# Patient Record
Sex: FEMALE | Race: WHITE | HISPANIC OR LATINO
[De-identification: names, ages, dates, MRNs, and addresses within clinical notes are randomized per-mention and may not be internally consistent; named-entity substitution may affect disease eponyms.]

---

## 2020-02-08 ENCOUNTER — TRANSCRIPTION ENCOUNTER (OUTPATIENT)
Age: 30
End: 2020-02-08

## 2020-03-21 ENCOUNTER — TRANSCRIPTION ENCOUNTER (OUTPATIENT)
Age: 30
End: 2020-03-21

## 2020-06-28 ENCOUNTER — FORM ENCOUNTER (OUTPATIENT)
Age: 30
End: 2020-06-28

## 2020-12-20 ENCOUNTER — FORM ENCOUNTER (OUTPATIENT)
Age: 30
End: 2020-12-20

## 2021-01-28 ENCOUNTER — OUTPATIENT (OUTPATIENT)
Dept: OUTPATIENT SERVICES | Facility: HOSPITAL | Age: 31
LOS: 1 days | Discharge: HOME | End: 2021-01-28
Payer: COMMERCIAL

## 2021-01-28 DIAGNOSIS — E05.90 THYROTOXICOSIS, UNSPECIFIED WITHOUT THYROTOXIC CRISIS OR STORM: ICD-10-CM

## 2021-01-28 PROCEDURE — 76536 US EXAM OF HEAD AND NECK: CPT | Mod: 26

## 2021-06-02 ENCOUNTER — RX RENEWAL (OUTPATIENT)
Age: 31
End: 2021-06-02

## 2021-06-02 PROBLEM — Z00.00 ENCOUNTER FOR PREVENTIVE HEALTH EXAMINATION: Status: ACTIVE | Noted: 2021-06-02

## 2021-06-21 ENCOUNTER — APPOINTMENT (OUTPATIENT)
Dept: OBGYN | Facility: CLINIC | Age: 31
End: 2021-06-21
Payer: COMMERCIAL

## 2021-06-21 VITALS
HEART RATE: 99 BPM | TEMPERATURE: 98.8 F | SYSTOLIC BLOOD PRESSURE: 126 MMHG | DIASTOLIC BLOOD PRESSURE: 91 MMHG | WEIGHT: 194 LBS | BODY MASS INDEX: 34.38 KG/M2 | HEIGHT: 63 IN

## 2021-06-21 DIAGNOSIS — Z82.49 FAMILY HISTORY OF ISCHEMIC HEART DISEASE AND OTHER DISEASES OF THE CIRCULATORY SYSTEM: ICD-10-CM

## 2021-06-21 DIAGNOSIS — Z78.9 OTHER SPECIFIED HEALTH STATUS: ICD-10-CM

## 2021-06-21 DIAGNOSIS — Z83.42 FAMILY HISTORY OF FAMILIAL HYPERCHOLESTEROLEMIA: ICD-10-CM

## 2021-06-21 PROCEDURE — 99213 OFFICE O/P EST LOW 20 MIN: CPT | Mod: 25

## 2021-06-21 PROCEDURE — 99072 ADDL SUPL MATRL&STAF TM PHE: CPT

## 2021-06-21 PROCEDURE — 76830 TRANSVAGINAL US NON-OB: CPT

## 2021-06-21 NOTE — PLAN
[FreeTextEntry1] : Transvaginal ultrasound done in office today remarkable for right ovarian simple cyst, otherwise unremarkable.  We will continue oral contraceptive pills for menstrual regulation, ovarian cyst suppression, and pregnancy prevention.  If she continues to form ovarian cysts despite being on medication will consider increasing dose at next visit.

## 2021-06-21 NOTE — PHYSICAL EXAM
[Appropriately responsive] : appropriately responsive [Alert] : alert [No Acute Distress] : no acute distress [Oriented x3] : oriented x3 [Labia Majora] : normal [Labia Minora] : normal [Normal] : normal [Uterine Adnexae] : normal [FreeTextEntry5] : Nonlabored

## 2021-06-21 NOTE — HISTORY OF PRESENT ILLNESS
[FreeTextEntry1] : 31-year-old female here for follow-up GYN visit.  She has a history of menorrhagia and a right ovarian cyst.  She is on oral contraceptive pills for menstrual regulation, ovarian cyst suppression, and pregnancy prevention.  She is taking the medication as prescribed, denies unwanted side effects, cycles much improved.

## 2021-06-21 NOTE — PROCEDURE
[Abnormal Uterine Bleeding] : abnormal uterine bleeding [Suspected Ovarian Cyst] : suspected ovarian cyst [Transvaginal Ultrasound] : transvaginal ultrasound [L: ___ cm] : L: [unfilled] cm [W: ___cm] : W: [unfilled] cm [H: ___ cm] : H: [unfilled] cm [FreeTextEntry9] : Menorrhagia [FreeTextEntry5] : 63 cc, 4.1 mm EMS [FreeTextEntry7] : 3.5 x 3.7 x 2.7 cm, 2.5 cm right ovarian simple cyst [FreeTextEntry8] : 2.3 x 2.6 x 2.8 cm, normal-appearing

## 2021-06-24 ENCOUNTER — TRANSCRIPTION ENCOUNTER (OUTPATIENT)
Age: 31
End: 2021-06-24

## 2021-06-29 ENCOUNTER — RX RENEWAL (OUTPATIENT)
Age: 31
End: 2021-06-29

## 2021-09-13 ENCOUNTER — APPOINTMENT (OUTPATIENT)
Dept: OBGYN | Facility: CLINIC | Age: 31
End: 2021-09-13
Payer: COMMERCIAL

## 2021-09-13 ENCOUNTER — NON-APPOINTMENT (OUTPATIENT)
Age: 31
End: 2021-09-13

## 2021-09-13 VITALS
HEART RATE: 121 BPM | WEIGHT: 193 LBS | SYSTOLIC BLOOD PRESSURE: 126 MMHG | TEMPERATURE: 98.9 F | HEIGHT: 63 IN | BODY MASS INDEX: 34.2 KG/M2 | DIASTOLIC BLOOD PRESSURE: 94 MMHG

## 2021-09-13 DIAGNOSIS — N76.0 ACUTE VAGINITIS: ICD-10-CM

## 2021-09-13 LAB
BILIRUB UR QL STRIP: NORMAL
CLARITY UR: CLEAR
COLLECTION METHOD: NORMAL
GLUCOSE UR-MCNC: NORMAL
HCG UR QL: 0.2 EU/DL
HGB UR QL STRIP.AUTO: NORMAL
KETONES UR-MCNC: NORMAL
LEUKOCYTE ESTERASE UR QL STRIP: NORMAL
NITRITE UR QL STRIP: NORMAL
PH UR STRIP: 7
PROT UR STRIP-MCNC: NORMAL
SP GR UR STRIP: 1.01

## 2021-09-13 PROCEDURE — 81003 URINALYSIS AUTO W/O SCOPE: CPT | Mod: QW

## 2021-09-13 PROCEDURE — 99213 OFFICE O/P EST LOW 20 MIN: CPT

## 2021-09-13 NOTE — PLAN
[FreeTextEntry1] : Urine dip in office negative, will follow up urinary and vaginal cultures and treat accordingly.\par Perineal hygiene practices reviewed, advised to avoid products with containing additives/scents or antibacterial components, and the use of cotton under garments and loose fitting clothing.\par

## 2021-09-13 NOTE — HISTORY OF PRESENT ILLNESS
[FreeTextEntry1] : 31-year-old G0 here for follow-up GYN visit.  She started experiencing recent vulvovaginal irritation with intense itching at the introitus and externally.  She used over-the-counter treatments which initially improved her symptoms however they recurred.  Symptoms did improve using cold compresses.  She denies any vaginal odor, discharge, or pain with urination.

## 2021-09-14 LAB
APPEARANCE: CLEAR
BILIRUBIN URINE: NEGATIVE
BLOOD URINE: NEGATIVE
COLOR: NORMAL
GLUCOSE QUALITATIVE U: NEGATIVE
KETONES URINE: NEGATIVE
LEUKOCYTE ESTERASE URINE: NEGATIVE
NITRITE URINE: NEGATIVE
PH URINE: 6.5
PROTEIN URINE: NEGATIVE
SPECIFIC GRAVITY URINE: 1.01
UROBILINOGEN URINE: NORMAL

## 2021-09-15 LAB — BACTERIA UR CULT: NORMAL

## 2021-10-22 ENCOUNTER — RX RENEWAL (OUTPATIENT)
Age: 31
End: 2021-10-22

## 2021-12-15 ENCOUNTER — RX RENEWAL (OUTPATIENT)
Age: 31
End: 2021-12-15

## 2021-12-17 ENCOUNTER — RX RENEWAL (OUTPATIENT)
Age: 31
End: 2021-12-17

## 2021-12-17 ENCOUNTER — NON-APPOINTMENT (OUTPATIENT)
Age: 31
End: 2021-12-17

## 2022-01-07 ENCOUNTER — RX RENEWAL (OUTPATIENT)
Age: 32
End: 2022-01-07

## 2022-01-10 ENCOUNTER — APPOINTMENT (OUTPATIENT)
Dept: OBGYN | Facility: CLINIC | Age: 32
End: 2022-01-10
Payer: COMMERCIAL

## 2022-01-10 VITALS
SYSTOLIC BLOOD PRESSURE: 136 MMHG | TEMPERATURE: 97.3 F | HEART RATE: 111 BPM | DIASTOLIC BLOOD PRESSURE: 90 MMHG | BODY MASS INDEX: 34.2 KG/M2 | HEIGHT: 63 IN | WEIGHT: 193 LBS

## 2022-01-10 DIAGNOSIS — E03.9 HYPOTHYROIDISM, UNSPECIFIED: ICD-10-CM

## 2022-01-10 DIAGNOSIS — Z11.3 ENCOUNTER FOR SCREENING FOR INFECTIONS WITH A PREDOMINANTLY SEXUAL MODE OF TRANSMISSION: ICD-10-CM

## 2022-01-10 LAB
BILIRUB UR QL STRIP: NEGATIVE
CLARITY UR: CLEAR
COLLECTION METHOD: NORMAL
GLUCOSE UR-MCNC: NEGATIVE
HCG UR QL: 0.2 EU/DL
HGB UR QL STRIP.AUTO: NORMAL
KETONES UR-MCNC: NEGATIVE
LEUKOCYTE ESTERASE UR QL STRIP: NEGATIVE
NITRITE UR QL STRIP: NEGATIVE
PH UR STRIP: 6
PROT UR STRIP-MCNC: NEGATIVE
SP GR UR STRIP: <=1.005

## 2022-01-10 PROCEDURE — 76830 TRANSVAGINAL US NON-OB: CPT

## 2022-01-10 PROCEDURE — 81003 URINALYSIS AUTO W/O SCOPE: CPT | Mod: QW

## 2022-01-10 PROCEDURE — 99395 PREV VISIT EST AGE 18-39: CPT | Mod: 25

## 2022-01-10 NOTE — PLAN
[FreeTextEntry1] : Pap smear with reflex HPV testing, GC CT and trichomonas cultures done today.\par Transvaginal ultrasound done in office to better visualize pelvic anatomic structures notable for right ovarian simple cyst, otherwise unremarkable, will continue to monitor.  We will continue oral contraceptive pills for menstrual regulation, cyst suppression, and pregnancy prevention.  If she continues to form cysts on current medication regime will consider altering dosage at next visit.

## 2022-01-10 NOTE — PROCEDURE
[Cervical Pap Smear] : cervical Pap smear [Liquid Base] : liquid base [Tolerated Well] : the patient tolerated the procedure well [No Complications] : there were no complications [Suspected Ovarian Cyst] : suspected ovarian cyst [Transvaginal Ultrasound] : transvaginal ultrasound [L: ___ cm] : L: [unfilled] cm [W: ___cm] : W: [unfilled] cm [H: ___ cm] : H: [unfilled] cm [FreeTextEntry5] : 38 cc, 3.3 mm EMS<normal-appearing cervix, normal-appearing cul-de-sac [FreeTextEntry7] : 4.7 x 4.5 x 3.5 cm, 3 cm simple cyst [FreeTextEntry8] : 3 x 2.4 x 2.4 cm, normal-appearing

## 2022-01-10 NOTE — PHYSICAL EXAM
[Chaperone Present] : A chaperone was present in the examining room during all aspects of the physical examination [Appropriately responsive] : appropriately responsive [Alert] : alert [No Acute Distress] : no acute distress [Oriented x3] : oriented x3 [Labia Majora] : normal [Labia Minora] : normal [Uterine Adnexae] : normal [Examination Of The Breasts] : a normal appearance [Normal] : normal [No Masses] : no breast masses were palpable [FreeTextEntry5] : Nonlabored

## 2022-01-10 NOTE — HISTORY OF PRESENT ILLNESS
[FreeTextEntry1] : 31-year-old G0 here for annual GYN visit.  She is on oral contraceptive pills for menstrual regulation, cyst suppression and pregnancy prevention.  She is taking medication as prescribed, denies unwanted side effects, menstrual cycles much improved on current medication regime, denies current cystlike pain.  She sexual active with 1 partner, inconsistent condom use.

## 2022-01-14 LAB
C TRACH RRNA SPEC QL NAA+PROBE: NOT DETECTED
N GONORRHOEA RRNA SPEC QL NAA+PROBE: NOT DETECTED
SOURCE AMPLIFICATION: NORMAL
SOURCE AMPLIFICATION: NORMAL
T VAGINALIS RRNA SPEC QL NAA+PROBE: NOT DETECTED

## 2022-01-18 LAB — CYTOLOGY CVX/VAG DOC THIN PREP: NORMAL

## 2022-06-27 ENCOUNTER — APPOINTMENT (OUTPATIENT)
Dept: OBGYN | Facility: CLINIC | Age: 32
End: 2022-06-27
Payer: COMMERCIAL

## 2022-06-27 VITALS
HEIGHT: 63 IN | TEMPERATURE: 98.2 F | DIASTOLIC BLOOD PRESSURE: 90 MMHG | BODY MASS INDEX: 34.38 KG/M2 | SYSTOLIC BLOOD PRESSURE: 136 MMHG | HEART RATE: 116 BPM | WEIGHT: 194 LBS

## 2022-06-27 DIAGNOSIS — N92.3 OVULATION BLEEDING: ICD-10-CM

## 2022-06-27 DIAGNOSIS — N92.0 EXCESSIVE AND FREQUENT MENSTRUATION WITH REGULAR CYCLE: ICD-10-CM

## 2022-06-27 PROCEDURE — 76830 TRANSVAGINAL US NON-OB: CPT

## 2022-06-27 PROCEDURE — 99213 OFFICE O/P EST LOW 20 MIN: CPT | Mod: 25

## 2022-06-27 NOTE — PROCEDURE
[Abnormal Uterine Bleeding] : abnormal uterine bleeding [Suspected Ovarian Cyst] : suspected ovarian cyst [F/U Abnormal US] : f/u abnormal ultrasound [Transvaginal Ultrasound] : transvaginal ultrasound [L: ___ cm] : L: [unfilled] cm [W: ___cm] : W: [unfilled] cm [H: ___ cm] : H: [unfilled] cm [FreeTextEntry5] : 76 cc, 11 mm EMS, normal-appearing cervix, normal-appearing cul-de-sac [FreeTextEntry7] : 2.4 x 2.3 x 2.2 cm, normal-appearing, cyst resolved [FreeTextEntry8] : 2.5 x 2.3 x 3.6 cm, normal-appearing

## 2022-06-27 NOTE — PLAN
[FreeTextEntry1] : Transvaginal ultrasound in office today to better visualize pelvic anatomic structures overall unremarkable, right ovarian cyst resolved, will continue to monitor.  We will continue oral contraceptive pills for menstrual regulation, cyst depression, and pregnancy prevention, will follow up in 6 months for reevaluation and annual exam.

## 2022-06-27 NOTE — HISTORY OF PRESENT ILLNESS
[FreeTextEntry1] : 32-year-old G0 here for follow-up GYN visit.  At her last visit she was noted to have a right ovarian cyst.  She is on oral contraceptive pills for menstrual regulation, cyst suppression and pregnancy prevention.  She is taking the medication as prescribed, denies unwanted side effects, cycles much improved with current medication regime.  \par She did recently skip the placebo pills as she was trying to avoid having a menstrual cycle while on vacation and now is experiencing irregular spotting for last 2 weeks.

## 2022-11-13 ENCOUNTER — EMERGENCY (EMERGENCY)
Facility: HOSPITAL | Age: 32
LOS: 0 days | Discharge: HOME | End: 2022-11-13
Attending: EMERGENCY MEDICINE | Admitting: EMERGENCY MEDICINE

## 2022-11-13 VITALS
SYSTOLIC BLOOD PRESSURE: 136 MMHG | HEART RATE: 85 BPM | RESPIRATION RATE: 18 BRPM | WEIGHT: 190.04 LBS | DIASTOLIC BLOOD PRESSURE: 86 MMHG | TEMPERATURE: 97 F | OXYGEN SATURATION: 100 %

## 2022-11-13 VITALS
OXYGEN SATURATION: 99 % | RESPIRATION RATE: 17 BRPM | HEART RATE: 75 BPM | SYSTOLIC BLOOD PRESSURE: 133 MMHG | TEMPERATURE: 98 F | DIASTOLIC BLOOD PRESSURE: 85 MMHG

## 2022-11-13 DIAGNOSIS — K80.20 CALCULUS OF GALLBLADDER WITHOUT CHOLECYSTITIS WITHOUT OBSTRUCTION: ICD-10-CM

## 2022-11-13 DIAGNOSIS — R11.2 NAUSEA WITH VOMITING, UNSPECIFIED: ICD-10-CM

## 2022-11-13 DIAGNOSIS — R10.11 RIGHT UPPER QUADRANT PAIN: ICD-10-CM

## 2022-11-13 LAB
ALBUMIN SERPL ELPH-MCNC: 4.8 G/DL — SIGNIFICANT CHANGE UP (ref 3.5–5.2)
ALP SERPL-CCNC: 57 U/L — SIGNIFICANT CHANGE UP (ref 30–115)
ALT FLD-CCNC: 26 U/L — SIGNIFICANT CHANGE UP (ref 0–41)
ANION GAP SERPL CALC-SCNC: 10 MMOL/L — SIGNIFICANT CHANGE UP (ref 7–14)
ANION GAP SERPL CALC-SCNC: 14 MMOL/L — SIGNIFICANT CHANGE UP (ref 7–14)
AST SERPL-CCNC: 25 U/L — SIGNIFICANT CHANGE UP (ref 0–41)
BASOPHILS # BLD AUTO: 0.03 K/UL — SIGNIFICANT CHANGE UP (ref 0–0.2)
BASOPHILS NFR BLD AUTO: 0.3 % — SIGNIFICANT CHANGE UP (ref 0–1)
BILIRUB DIRECT SERPL-MCNC: <0.2 MG/DL — SIGNIFICANT CHANGE UP (ref 0–0.3)
BILIRUB INDIRECT FLD-MCNC: >0.1 MG/DL — LOW (ref 0.2–1.2)
BILIRUB SERPL-MCNC: 0.3 MG/DL — SIGNIFICANT CHANGE UP (ref 0.2–1.2)
BUN SERPL-MCNC: 10 MG/DL — SIGNIFICANT CHANGE UP (ref 10–20)
BUN SERPL-MCNC: 11 MG/DL — SIGNIFICANT CHANGE UP (ref 10–20)
CALCIUM SERPL-MCNC: 9.1 MG/DL — SIGNIFICANT CHANGE UP (ref 8.4–10.5)
CALCIUM SERPL-MCNC: 9.3 MG/DL — SIGNIFICANT CHANGE UP (ref 8.4–10.5)
CHLORIDE SERPL-SCNC: 101 MMOL/L — SIGNIFICANT CHANGE UP (ref 98–110)
CHLORIDE SERPL-SCNC: 103 MMOL/L — SIGNIFICANT CHANGE UP (ref 98–110)
CO2 SERPL-SCNC: 20 MMOL/L — SIGNIFICANT CHANGE UP (ref 17–32)
CO2 SERPL-SCNC: 24 MMOL/L — SIGNIFICANT CHANGE UP (ref 17–32)
CREAT SERPL-MCNC: 0.7 MG/DL — SIGNIFICANT CHANGE UP (ref 0.7–1.5)
CREAT SERPL-MCNC: 0.7 MG/DL — SIGNIFICANT CHANGE UP (ref 0.7–1.5)
EGFR: 119 ML/MIN/1.73M2 — SIGNIFICANT CHANGE UP
EGFR: 119 ML/MIN/1.73M2 — SIGNIFICANT CHANGE UP
EOSINOPHIL # BLD AUTO: 0 K/UL — SIGNIFICANT CHANGE UP (ref 0–0.7)
EOSINOPHIL NFR BLD AUTO: 0 % — SIGNIFICANT CHANGE UP (ref 0–8)
GLUCOSE SERPL-MCNC: 105 MG/DL — HIGH (ref 70–99)
GLUCOSE SERPL-MCNC: 107 MG/DL — HIGH (ref 70–99)
HCG SERPL QL: NEGATIVE — SIGNIFICANT CHANGE UP
HCT VFR BLD CALC: 45.1 % — SIGNIFICANT CHANGE UP (ref 37–47)
HGB BLD-MCNC: 15.1 G/DL — SIGNIFICANT CHANGE UP (ref 12–16)
IMM GRANULOCYTES NFR BLD AUTO: 0.2 % — SIGNIFICANT CHANGE UP (ref 0.1–0.3)
LACTATE SERPL-SCNC: 1.1 MMOL/L — SIGNIFICANT CHANGE UP (ref 0.7–2)
LIDOCAIN IGE QN: 48 U/L — SIGNIFICANT CHANGE UP (ref 7–60)
LYMPHOCYTES # BLD AUTO: 1.05 K/UL — LOW (ref 1.2–3.4)
LYMPHOCYTES # BLD AUTO: 11.2 % — LOW (ref 20.5–51.1)
MCHC RBC-ENTMCNC: 27.8 PG — SIGNIFICANT CHANGE UP (ref 27–31)
MCHC RBC-ENTMCNC: 33.5 G/DL — SIGNIFICANT CHANGE UP (ref 32–37)
MCV RBC AUTO: 83.1 FL — SIGNIFICANT CHANGE UP (ref 81–99)
MONOCYTES # BLD AUTO: 0.25 K/UL — SIGNIFICANT CHANGE UP (ref 0.1–0.6)
MONOCYTES NFR BLD AUTO: 2.7 % — SIGNIFICANT CHANGE UP (ref 1.7–9.3)
NEUTROPHILS # BLD AUTO: 8.05 K/UL — HIGH (ref 1.4–6.5)
NEUTROPHILS NFR BLD AUTO: 85.6 % — HIGH (ref 42.2–75.2)
NRBC # BLD: 0 /100 WBCS — SIGNIFICANT CHANGE UP (ref 0–0)
PLATELET # BLD AUTO: 261 K/UL — SIGNIFICANT CHANGE UP (ref 130–400)
POTASSIUM SERPL-MCNC: 4.6 MMOL/L — SIGNIFICANT CHANGE UP (ref 3.5–5)
POTASSIUM SERPL-MCNC: 5.2 MMOL/L — HIGH (ref 3.5–5)
POTASSIUM SERPL-SCNC: 4.6 MMOL/L — SIGNIFICANT CHANGE UP (ref 3.5–5)
POTASSIUM SERPL-SCNC: 5.2 MMOL/L — HIGH (ref 3.5–5)
PROT SERPL-MCNC: 8.4 G/DL — HIGH (ref 6–8)
RBC # BLD: 5.43 M/UL — HIGH (ref 4.2–5.4)
RBC # FLD: 12.5 % — SIGNIFICANT CHANGE UP (ref 11.5–14.5)
SODIUM SERPL-SCNC: 135 MMOL/L — SIGNIFICANT CHANGE UP (ref 135–146)
SODIUM SERPL-SCNC: 137 MMOL/L — SIGNIFICANT CHANGE UP (ref 135–146)
WBC # BLD: 9.4 K/UL — SIGNIFICANT CHANGE UP (ref 4.8–10.8)
WBC # FLD AUTO: 9.4 K/UL — SIGNIFICANT CHANGE UP (ref 4.8–10.8)

## 2022-11-13 PROCEDURE — 74177 CT ABD & PELVIS W/CONTRAST: CPT | Mod: 26,MA

## 2022-11-13 PROCEDURE — 76705 ECHO EXAM OF ABDOMEN: CPT | Mod: 26

## 2022-11-13 PROCEDURE — 99285 EMERGENCY DEPT VISIT HI MDM: CPT

## 2022-11-13 NOTE — ED PROVIDER NOTE - PROGRESS NOTE DETAILS
Discussed results and provided copy to pt. Pt understands to follow-up with PMD/GI. Pt understands to return to ED if symptoms return/worsen. Agreeable to discharge.

## 2022-11-13 NOTE — ED PROVIDER NOTE - PROVIDER TOKENS
PROVIDER:[TOKEN:[[id number]],FOLLOWUP:[1-3 Days]],PROVIDER:[TOKEN:[[id number]],FOLLOWUP:[7-10 Days]]

## 2022-11-13 NOTE — ED PROVIDER NOTE - NSFOLLOWUPINSTRUCTIONS_ED_ALL_ED_FT
Our Emergency Department Referral Coordinators will be reaching out ot you in the next 24-48 hours from 9:00am to 5:00pm (Monday to Friday) with a follow up appointment. Please expect a phone call from the hospital in that time frame. If you do not receive a call or if you have any questions or concerns, you can reach them at [phone] or [phone].     Gallstones    Cholelithiasis (also called gallstones) is a form of gallbladder disease in which stones form in your gallbladder. The gallbladder is an organ that stores bile made in the liver, which helps digest fats. Gallstones begin as small crystals and slowly grow into stones. Gallstone pain occurs when the gallbladder spasms and a gallstone is blocking the duct. Pain can also occur when a stone passes out of the duct. Symptoms may include nausea, vomiting, abdominal pain, or yellowing of the skin. Only take over-the-counter or prescription medicines for pain, discomfort, or fever as directed by your health care provider.   Follow a low-fat diet until seen again by your health care provider.     SEEK IMMEDIATE MEDICAL CARE IF YOU HAVE THE FOLLOWING SYMPTOMS: increased pain not controlled with medication, fever, persistent vomiting, altered mental status.

## 2022-11-13 NOTE — ED PROVIDER NOTE - CARE PROVIDER_API CALL
Alma Mejia  INTERNAL MEDICINE  [address] [place] [id number]  Phone: [phone]  Fax: [phone]  Follow Up Time: 1-3 Days    Juan Foley)  Surgery  [address] [place] [address], [address] [place] [address]  Phone: [phone]  Fax: [phone]  Follow Up Time: 7-10 Days

## 2022-11-13 NOTE — ED PROVIDER NOTE - NS ED ATTENDING STATEMENT MOD
This was a shared visit with the SILKE. I reviewed and verified the documentation and independently performed the documented:

## 2022-11-13 NOTE — ED PROVIDER NOTE - ATTENDING APP SHARED VISIT CONTRIBUTION OF CARE
32-year-old female no past medical history presents with abdominal pain and vomiting.  Patient reports a few month history of epigastric pain.  Was diagnosed with GERD by PMD and started on a 2-week course of omeprazole and Pepcid.  Symptoms improved with his medications.  States that last night she ate late at night which exacerbates her symptoms and overnight developed sharp epigastric pain.  Pain woke her up around 3 AM.  Took Tums and Pepcid.  Also reports 2 episodes of nonbilious nonbloody vomiting.  No diarrhea.  States that pain usually resolves after about an hour but pain persisted so came in for evaluation.  No fevers or recent illness.  No surgeries.  No urinary symptoms.    CONSTITUTIONAL: Well-developed; well-nourished; in no acute distress.   SKIN: warm, dry  HEAD: Normocephalic; atraumatic.  EYES: PERRL, EOMI, no conjunctival erythema  ENT: No nasal discharge; airway clear.  NECK: Supple; non tender.  CARD: S1, S2 normal;  Regular rate and rhythm.   RESP: No wheezes, rales or rhonchi.  ABD: soft mild epigastric tenderness.  Non distended, no rebound or guarding  EXT: Normal ROM.  5/5 strength in all 4 extremities   LYMPH: No acute cervical adenopathy.  NEURO: Alert, oriented, grossly unremarkable. neurovascularly intact  PSYCH: Cooperative, appropriate.

## 2022-11-13 NOTE — ED PROVIDER NOTE - OBJECTIVE STATEMENT
32-year-old female with no significant past medical history presents to the ED complaining of intermittent upper abdominal pain x3 weeks.  Patient admits to occasional associated episodes of nausea and NBNB vomiting.  Patient was seen by her PMD, was diagnosed with gastritis and started on omeprazole and Pepcid.  Patient completed course of omeprazole, felt better up until last night when she had heavy meal and soon after developed moderate upper abdominal pain.  During this episode she had 2 episodes of NBNB vomiting.  Upon ED arrival symptoms have mostly resolved.  She denies other complaints.

## 2022-11-13 NOTE — ED PROVIDER NOTE - NS ED ROS FT
Review of Systems  Constitutional:  No fever, chills.  Eyes:  No visual changes, eye pain, or discharge.  ENMT:  No hearing changes, pain, or discharge. No nasal congestion, discharge, or bleeding. No throat pain, swelling, or difficulty swallowing.  Cardiac:  No chest pain, palpitations, syncope, or edema.  Respiratory:  No dyspnea, cough. No hemoptysis.  GI:  (+) nausea, vomiting, abdominal pain. No diarrhea  :  No dysuria, hematuria, frequency, or burning.   MS:  No back pain.  Skin:  No skin rash, pruritis, jaundice, or lesions.  Neuro:  No headache, dizziness, loss of sensation, or focal weakness.  No change in mental status.

## 2022-11-13 NOTE — ED ADULT TRIAGE NOTE - CHIEF COMPLAINT QUOTE
Patient complaining of abdominal pain- patient diagnosed with GERD and has been taking pepcid and ompreazole with no relief.

## 2022-11-13 NOTE — ED PROVIDER NOTE - PATIENT PORTAL LINK FT
You can access the FollowMyHealth Patient Portal offered by [facility] by registering at the following website: [email or web address]. By joining [facility]’s FollowMyHealth portal, you will also be able to view your health information using other applications (apps) compatible with our system.

## 2022-11-13 NOTE — ED PROVIDER NOTE - CLINICAL SUMMARY MEDICAL DECISION MAKING FREE TEXT BOX
Patient presents with abdominal pain.  Labs no CT done.  Found to have cholelithiasis diagnosis no cholecystitis.  Radiology recommending ERCP/MRCP.  Results discussed with patient.  Understands to follow-up with PMD and GI.  Return precautions discussed.

## 2022-12-29 ENCOUNTER — NON-APPOINTMENT (OUTPATIENT)
Age: 32
End: 2022-12-29

## 2023-02-22 ENCOUNTER — APPOINTMENT (OUTPATIENT)
Age: 33
End: 2023-02-22

## 2023-02-22 ENCOUNTER — APPOINTMENT (OUTPATIENT)
Dept: GASTROENTEROLOGY | Facility: CLINIC | Age: 33
End: 2023-02-22

## 2023-02-24 ENCOUNTER — NON-APPOINTMENT (OUTPATIENT)
Age: 33
End: 2023-02-24

## 2023-03-09 ENCOUNTER — APPOINTMENT (OUTPATIENT)
Dept: OBGYN | Facility: CLINIC | Age: 33
End: 2023-03-09

## 2023-03-27 ENCOUNTER — NON-APPOINTMENT (OUTPATIENT)
Age: 33
End: 2023-03-27

## 2023-03-27 ENCOUNTER — APPOINTMENT (OUTPATIENT)
Dept: OBGYN | Facility: CLINIC | Age: 33
End: 2023-03-27
Payer: COMMERCIAL

## 2023-03-27 VITALS
WEIGHT: 180 LBS | DIASTOLIC BLOOD PRESSURE: 87 MMHG | SYSTOLIC BLOOD PRESSURE: 147 MMHG | HEART RATE: 123 BPM | BODY MASS INDEX: 31.89 KG/M2 | HEIGHT: 63 IN

## 2023-03-27 DIAGNOSIS — Z01.411 ENCOUNTER FOR GYNECOLOGICAL EXAMINATION (GENERAL) (ROUTINE) WITH ABNORMAL FINDINGS: ICD-10-CM

## 2023-03-27 DIAGNOSIS — N83.291 OTHER OVARIAN CYST, RIGHT SIDE: ICD-10-CM

## 2023-03-27 DIAGNOSIS — Z30.41 ENCOUNTER FOR SURVEILLANCE OF CONTRACEPTIVE PILLS: ICD-10-CM

## 2023-03-27 LAB
BILIRUB UR QL STRIP: NORMAL
CLARITY UR: CLEAR
COLLECTION METHOD: NORMAL
GLUCOSE UR-MCNC: NORMAL
HCG UR QL: 0.2 EU/DL
HGB UR QL STRIP.AUTO: NORMAL
KETONES UR-MCNC: NORMAL
LEUKOCYTE ESTERASE UR QL STRIP: NORMAL
NITRITE UR QL STRIP: NORMAL
PH UR STRIP: 6.5
PROT UR STRIP-MCNC: NORMAL
SP GR UR STRIP: 1.01

## 2023-03-27 PROCEDURE — 99395 PREV VISIT EST AGE 18-39: CPT | Mod: 25

## 2023-03-27 PROCEDURE — 81003 URINALYSIS AUTO W/O SCOPE: CPT | Mod: NC,QW

## 2023-03-27 PROCEDURE — 76830 TRANSVAGINAL US NON-OB: CPT

## 2023-03-27 NOTE — PHYSICAL EXAM
[Chaperone Present] : A chaperone was present in the examining room during all aspects of the physical examination [Appropriately responsive] : appropriately responsive [Alert] : alert [No Acute Distress] : no acute distress [Soft] : soft [Non-tender] : non-tender [Non-distended] : non-distended [Oriented x3] : oriented x3 [Examination Of The Breasts] : a normal appearance [No Masses] : no breast masses were palpable [Labia Majora] : normal [Labia Minora] : normal [Normal] : normal [Retroversion] : retroverted [___] : [unfilled] ~Ucm mass on the right [Uterine Adnexae] : normal

## 2023-03-27 NOTE — PROCEDURE
[Cervical Pap Smear] : cervical Pap smear [Liquid Base] : liquid base [GC & Chlamydia via Pap] : GC & Chlamydia via Pap [Tolerated Well] : the patient tolerated the procedure well [No Complications] : there were no complications [Suspected Ovarian Cyst] : suspected ovarian cyst [Transvaginal Ultrasound] : transvaginal ultrasound [Retroverted] : retroverted [No Fibroid(s)] : no fibroid(s) [L: ___ cm] : L: [unfilled] cm [W: ___cm] : W: [unfilled] cm [H: ___ cm] : H: [unfilled] cm [FreeTextEntry5] : 29.10cc total vol [FreeTextEntry7] : 2.80cc simple cyst with septum  [FreeTextEntry8] : 2.31cc  normal ovary

## 2023-03-27 NOTE — HISTORY OF PRESENT ILLNESS
[FreeTextEntry1] : annual Visit, renewal of COCs [Y] : Patient is sexually active [N] : Patient denies prior pregnancies [LMPDate] : 3/20/23 [MensesFreq] : 28 [MensesLength] : 5 [MensesAmount] : mod-light [Regular Cycle Intervals] : periods have been regular [Currently Active] : currently active [No] : No

## 2023-03-27 NOTE — PLAN
[FreeTextEntry1] : increase to 30 mcg pill\par pt has right ovarian cyst for approx 2 yrs, she is unsure if she had it prior to being seen in our office because she never had an ultrasound prior to her visit. states she is asymptomatic. Cyst was not seen on ultrasound at her 06/2022 visit\par

## 2023-03-30 ENCOUNTER — NON-APPOINTMENT (OUTPATIENT)
Age: 33
End: 2023-03-30

## 2023-03-30 LAB
C TRACH RRNA SPEC QL NAA+PROBE: NOT DETECTED
CYTOLOGY CVX/VAG DOC THIN PREP: NORMAL
HPV HIGH+LOW RISK DNA PNL CVX: NOT DETECTED
N GONORRHOEA RRNA SPEC QL NAA+PROBE: NOT DETECTED
SOURCE AMPLIFICATION: NORMAL

## 2023-06-16 ENCOUNTER — NON-APPOINTMENT (OUTPATIENT)
Age: 33
End: 2023-06-16

## 2023-09-28 ENCOUNTER — APPOINTMENT (OUTPATIENT)
Dept: OBGYN | Facility: CLINIC | Age: 33
End: 2023-09-28
Payer: COMMERCIAL

## 2023-09-28 VITALS
HEIGHT: 63 IN | WEIGHT: 199 LBS | DIASTOLIC BLOOD PRESSURE: 87 MMHG | HEART RATE: 114 BPM | BODY MASS INDEX: 35.26 KG/M2 | SYSTOLIC BLOOD PRESSURE: 131 MMHG

## 2023-09-28 PROCEDURE — 76830 TRANSVAGINAL US NON-OB: CPT

## 2023-09-28 RX ORDER — NORETHINDRONE ACETATE AND ETHINYL ESTRADIOL AND FERROUS FUMARATE 1MG-20(21)
1-20 KIT ORAL
Qty: 84 | Refills: 0 | Status: COMPLETED | COMMUNITY
Start: 2021-06-02 | End: 2023-09-28

## 2023-09-28 RX ORDER — LEVOTHYROXINE SODIUM 0.17 MG/1
TABLET ORAL
Refills: 0 | Status: ACTIVE | COMMUNITY

## 2023-09-28 RX ORDER — NORETHINDRONE ACETATE AND ETHINYL ESTRADIOL AND FERROUS FUMARATE 1MG-20(21)
1-20 KIT ORAL
Qty: 84 | Refills: 0 | Status: COMPLETED | COMMUNITY
Start: 2022-01-10 | End: 2023-09-28

## 2023-12-28 ENCOUNTER — APPOINTMENT (OUTPATIENT)
Dept: OBGYN | Facility: CLINIC | Age: 33
End: 2023-12-28
Payer: COMMERCIAL

## 2023-12-28 VITALS
WEIGHT: 196 LBS | HEART RATE: 137 BPM | BODY MASS INDEX: 34.73 KG/M2 | DIASTOLIC BLOOD PRESSURE: 95 MMHG | HEIGHT: 63 IN | SYSTOLIC BLOOD PRESSURE: 134 MMHG

## 2023-12-28 DIAGNOSIS — N83.292 OTHER OVARIAN CYST, LEFT SIDE: ICD-10-CM

## 2023-12-28 PROCEDURE — 76830 TRANSVAGINAL US NON-OB: CPT

## 2023-12-28 RX ORDER — NORETHINDRONE ACETATE AND ETHINYL ESTRADIOL 1; 20 MG/1; UG/1
1-20 TABLET ORAL
Qty: 1 | Refills: 0 | Status: ACTIVE | COMMUNITY
Start: 2021-06-21

## 2023-12-28 NOTE — PROCEDURE
[Suspected Ovarian Cyst] : suspected ovarian cyst [F/U Abnormal US] : f/u abnormal ultrasound [Transvaginal Ultrasound] : transvaginal ultrasound [Retroverted] : retroverted [FreeTextEntry5] : not repeated today [FreeTextEntry7] : 3.76cc [FreeTextEntry8] : 0.63cc cyst, decreased from 9.36cc, total ovarian vol  5.57cc

## 2023-12-28 NOTE — HISTORY OF PRESENT ILLNESS
[Oral Contraceptive] : uses oral contraception pills [Y] : Patient is sexually active [N] : Patient denies prior pregnancies [LMPDate] : 12/23/23 [MensesFreq] : 28 [MensesLength] : 5 [MensesAmount] : mod-light [Regular Cycle Intervals] : periods have been regular [FreeTextEntry1] : 12/23/23 [Currently Active] : currently active [No] : No

## 2024-03-05 ENCOUNTER — RX RENEWAL (OUTPATIENT)
Age: 34
End: 2024-03-05

## 2024-03-28 ENCOUNTER — APPOINTMENT (OUTPATIENT)
Dept: OBGYN | Facility: CLINIC | Age: 34
End: 2024-03-28

## 2024-05-08 ENCOUNTER — RX RENEWAL (OUTPATIENT)
Age: 34
End: 2024-05-08

## 2024-05-11 ENCOUNTER — RX RENEWAL (OUTPATIENT)
Age: 34
End: 2024-05-11

## 2024-05-11 RX ORDER — NORETHINDRONE ACETATE AND ETHINYL ESTRADIOL AND FERROUS FUMARATE 1.5-30(21)
1.5-3 KIT ORAL
Qty: 84 | Refills: 3 | Status: ACTIVE | COMMUNITY
Start: 2023-03-27 | End: 1900-01-01

## 2024-07-28 PROBLEM — N92.0 INTERMENSTRUAL SPOTTING: Status: ACTIVE | Noted: 2022-06-27

## 2024-08-01 ENCOUNTER — APPOINTMENT (OUTPATIENT)
Dept: OBGYN | Facility: CLINIC | Age: 34
End: 2024-08-01
Payer: COMMERCIAL

## 2024-08-01 VITALS
SYSTOLIC BLOOD PRESSURE: 121 MMHG | HEIGHT: 63 IN | BODY MASS INDEX: 35.08 KG/M2 | HEART RATE: 117 BPM | DIASTOLIC BLOOD PRESSURE: 73 MMHG | WEIGHT: 198 LBS

## 2024-08-01 DIAGNOSIS — Z30.41 ENCOUNTER FOR SURVEILLANCE OF CONTRACEPTIVE PILLS: ICD-10-CM

## 2024-08-01 DIAGNOSIS — Z01.419 ENCOUNTER FOR GYNECOLOGICAL EXAMINATION (GENERAL) (ROUTINE) W/OUT ABNORMAL FINDINGS: ICD-10-CM

## 2024-08-01 LAB
BILIRUB UR QL STRIP: NORMAL
CLARITY UR: CLEAR
COLLECTION METHOD: NORMAL
GLUCOSE UR-MCNC: NORMAL
HCG UR QL: 0.2 EU/DL
HGB UR QL STRIP.AUTO: ABNORMAL
KETONES UR-MCNC: NORMAL
LEUKOCYTE ESTERASE UR QL STRIP: NORMAL
NITRITE UR QL STRIP: NORMAL
PH UR STRIP: 6.5
PROT UR STRIP-MCNC: NORMAL
SP GR UR STRIP: 1.01

## 2024-08-01 PROCEDURE — 81003 URINALYSIS AUTO W/O SCOPE: CPT | Mod: QW

## 2024-08-01 PROCEDURE — 99459 PELVIC EXAMINATION: CPT

## 2024-08-01 PROCEDURE — 99395 PREV VISIT EST AGE 18-39: CPT

## 2024-08-01 NOTE — PHYSICAL EXAM
[Chaperone Present] : A chaperone was present in the examining room during all aspects of the physical examination [[id number]] : A chaperone was present during the pelvic exam. [Appropriately responsive] : appropriately responsive [Alert] : alert [No Acute Distress] : no acute distress [Oriented x3] : oriented x3 [Examination Of The Breasts] : a normal appearance [No Discharge] : no discharge [No Masses] : no breast masses were palpable [Labia Majora] : normal [Labia Minora] : normal [Normal] : normal [Retroversion] : retroverted [Uterine Adnexae] : normal

## 2024-08-01 NOTE — HISTORY OF PRESENT ILLNESS
[FreeTextEntry1] : annual visit, no complaints renewal of OCPs, has hx of ovarian cysts [Oral Contraceptive] : uses oral contraception pills [Y] : Patient is sexually active [N] : Patient denies prior pregnancies [LMPDate] : 7/9/24 [MensesFreq] : 28 [MensesLength] : 5 [MensesAmount] : mod-light [Regular Cycle Intervals] : periods have been regular [Frequency: Q ___ days] : menstrual periods occur approximately every [unfilled] days [Currently Active] : currently active [No] : No

## 2024-08-06 LAB
CYTOLOGY CVX/VAG DOC THIN PREP: NORMAL
HPV HIGH+LOW RISK DNA PNL CVX: NOT DETECTED

## 2025-01-04 RX ORDER — NORETHINDRONE ACETATE AND ETHINYL ESTRADIOL AND FERROUS FUMARATE 1MG-20(21)
1-20 KIT ORAL
Qty: 84 | Refills: 1 | Status: ACTIVE | COMMUNITY
Start: 2025-01-02 | End: 1900-01-01

## 2025-06-04 ENCOUNTER — OUTPATIENT (OUTPATIENT)
Dept: OUTPATIENT SERVICES | Facility: HOSPITAL | Age: 35
LOS: 1 days | End: 2025-06-04
Payer: COMMERCIAL

## 2025-06-04 DIAGNOSIS — R10.2 PELVIC AND PERINEAL PAIN: ICD-10-CM

## 2025-06-04 DIAGNOSIS — Z00.8 ENCOUNTER FOR OTHER GENERAL EXAMINATION: ICD-10-CM

## 2025-06-04 PROCEDURE — 76830 TRANSVAGINAL US NON-OB: CPT | Mod: 26

## 2025-06-04 PROCEDURE — 76830 TRANSVAGINAL US NON-OB: CPT

## 2025-06-05 DIAGNOSIS — R10.2 PELVIC AND PERINEAL PAIN: ICD-10-CM

## 2025-07-07 ENCOUNTER — RX RENEWAL (OUTPATIENT)
Age: 35
End: 2025-07-07

## 2025-08-04 ENCOUNTER — APPOINTMENT (OUTPATIENT)
Dept: OBGYN | Facility: CLINIC | Age: 35
End: 2025-08-04